# Patient Record
Sex: FEMALE | URBAN - METROPOLITAN AREA
[De-identification: names, ages, dates, MRNs, and addresses within clinical notes are randomized per-mention and may not be internally consistent; named-entity substitution may affect disease eponyms.]

---

## 2020-09-01 ENCOUNTER — OFFICE VISIT (OUTPATIENT)
Dept: RHEUMATOLOGY | Facility: CLINIC | Age: 45
End: 2020-09-01
Payer: MEDICARE

## 2020-09-01 VITALS
DIASTOLIC BLOOD PRESSURE: 83 MMHG | HEART RATE: 47 BPM | HEIGHT: 66 IN | BODY MASS INDEX: 34.39 KG/M2 | SYSTOLIC BLOOD PRESSURE: 144 MMHG | WEIGHT: 214 LBS

## 2020-09-01 DIAGNOSIS — L40.50 PSORIATIC ARTHRITIS: ICD-10-CM

## 2020-09-01 DIAGNOSIS — L40.9 PSORIASIS: Primary | ICD-10-CM

## 2020-09-01 DIAGNOSIS — E03.8 OTHER SPECIFIED HYPOTHYROIDISM: ICD-10-CM

## 2020-09-01 DIAGNOSIS — G81.02: ICD-10-CM

## 2020-09-01 PROBLEM — E89.0 POSTOPERATIVE HYPOTHYROIDISM: Status: ACTIVE | Noted: 2020-07-23

## 2020-09-01 PROBLEM — G81.14 LEFT SPASTIC HEMIPLEGIA: Status: ACTIVE | Noted: 2019-05-21

## 2020-09-01 PROBLEM — M24.522 CONTRACTURE OF LEFT ELBOW: Status: ACTIVE | Noted: 2017-12-04

## 2020-09-01 PROBLEM — I10 ESSENTIAL HYPERTENSION: Status: ACTIVE | Noted: 2020-07-23

## 2020-09-01 PROBLEM — M67.00 CONTRACTURE OF ACHILLES TENDON: Status: ACTIVE | Noted: 2017-03-29

## 2020-09-01 PROBLEM — M24.542 FLEXION CONTRACTURE OF JOINT OF HAND, LEFT: Status: ACTIVE | Noted: 2017-12-04

## 2020-09-01 PROBLEM — G83.14 PARALYSIS OF LEFT LOWER EXTREMITY: Status: ACTIVE | Noted: 2020-02-03

## 2020-09-01 PROBLEM — M19.90 GENERALIZED ARTHRITIS: Status: ACTIVE | Noted: 2020-02-03

## 2020-09-01 PROBLEM — S06.9X9A TRAUMATIC BRAIN INJURY WITH LOSS OF CONSCIOUSNESS: Status: ACTIVE | Noted: 2018-03-15

## 2020-09-01 PROBLEM — M16.4 POST-TRAUMATIC OSTEOARTHRITIS OF BOTH HIPS: Status: ACTIVE | Noted: 2017-03-29

## 2020-09-01 PROCEDURE — 99999 PR PBB SHADOW E&M-NEW PATIENT-LVL III: CPT | Mod: PBBFAC,,, | Performed by: INTERNAL MEDICINE

## 2020-09-01 PROCEDURE — 99205 PR OFFICE/OUTPT VISIT, NEW, LEVL V, 60-74 MIN: ICD-10-PCS | Mod: S$PBB,,, | Performed by: INTERNAL MEDICINE

## 2020-09-01 PROCEDURE — 99205 OFFICE O/P NEW HI 60 MIN: CPT | Mod: S$PBB,,, | Performed by: INTERNAL MEDICINE

## 2020-09-01 PROCEDURE — 99999 PR PBB SHADOW E&M-NEW PATIENT-LVL III: ICD-10-PCS | Mod: PBBFAC,,, | Performed by: INTERNAL MEDICINE

## 2020-09-01 PROCEDURE — 99203 OFFICE O/P NEW LOW 30 MIN: CPT | Mod: PBBFAC,PO | Performed by: INTERNAL MEDICINE

## 2020-09-01 RX ORDER — SULFASALAZINE 500 MG/1
500 TABLET, DELAYED RELEASE ORAL 2 TIMES DAILY
Qty: 60 TABLET | Refills: 5 | Status: SHIPPED | OUTPATIENT
Start: 2020-09-01 | End: 2020-10-01

## 2020-09-01 RX ORDER — LEVOTHYROXINE SODIUM 88 UG/1
88 TABLET ORAL DAILY
COMMUNITY
Start: 2020-07-09

## 2020-09-01 RX ORDER — BACLOFEN 10 MG/1
10 TABLET ORAL 3 TIMES DAILY
COMMUNITY
Start: 2020-07-02

## 2020-09-01 RX ORDER — POTASSIUM CHLORIDE 750 MG/1
10 TABLET, EXTENDED RELEASE ORAL DAILY
COMMUNITY
Start: 2020-07-23

## 2020-09-01 RX ORDER — MEDROXYPROGESTERONE ACETATE 150 MG/ML
INJECTION, SUSPENSION INTRAMUSCULAR
COMMUNITY
Start: 2020-07-10

## 2020-09-01 RX ORDER — CHOLECALCIFEROL (VITAMIN D3) 25 MCG
1000 TABLET ORAL DAILY
COMMUNITY

## 2020-09-01 RX ORDER — GABAPENTIN 100 MG/1
100 CAPSULE ORAL 2 TIMES DAILY
Qty: 60 CAPSULE | Refills: 4 | Status: SHIPPED | OUTPATIENT
Start: 2020-09-01 | End: 2021-09-01

## 2020-09-01 RX ORDER — TRIAMCINOLONE ACETONIDE 1 MG/G
OINTMENT TOPICAL 2 TIMES DAILY
Qty: 80 G | Refills: 3 | Status: SHIPPED | OUTPATIENT
Start: 2020-09-01 | End: 2020-10-01

## 2020-09-01 RX ORDER — LISINOPRIL 5 MG/1
TABLET ORAL
COMMUNITY
Start: 2020-08-31

## 2020-09-01 RX ORDER — FUROSEMIDE 20 MG/1
TABLET ORAL
COMMUNITY
Start: 2020-08-31

## 2020-09-01 RX ORDER — CLOBETASOL PROPIONATE 0.5 MG/G
OINTMENT TOPICAL 2 TIMES DAILY
Qty: 45 G | Refills: 3 | Status: SHIPPED | OUTPATIENT
Start: 2020-09-01
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
## 2020-09-22 ENCOUNTER — TELEPHONE (OUTPATIENT)
Dept: RHEUMATOLOGY | Facility: CLINIC | Age: 45
End: 2020-09-22

## 2020-09-22 NOTE — TELEPHONE ENCOUNTER
----- Message from Sushil Kraus MD sent at 9/20/2020  6:17 PM CDT -----  platlets are low crp is high, lft are elevated, add milk thistle  To your diet

## 2020-09-22 NOTE — TELEPHONE ENCOUNTER
Attempted to contact patient regarding results and instructions. Unable to reach or leave message no answer.